# Patient Record
Sex: MALE | Race: BLACK OR AFRICAN AMERICAN | NOT HISPANIC OR LATINO
[De-identification: names, ages, dates, MRNs, and addresses within clinical notes are randomized per-mention and may not be internally consistent; named-entity substitution may affect disease eponyms.]

---

## 2021-03-30 PROBLEM — Z00.00 ENCOUNTER FOR PREVENTIVE HEALTH EXAMINATION: Status: ACTIVE | Noted: 2021-03-30

## 2021-05-06 ENCOUNTER — APPOINTMENT (OUTPATIENT)
Dept: ENDOCRINOLOGY | Facility: CLINIC | Age: 56
End: 2021-05-06
Payer: MEDICAID

## 2021-05-06 ENCOUNTER — LABORATORY RESULT (OUTPATIENT)
Age: 56
End: 2021-05-06

## 2021-05-06 VITALS
DIASTOLIC BLOOD PRESSURE: 80 MMHG | HEIGHT: 73 IN | BODY MASS INDEX: 21.74 KG/M2 | SYSTOLIC BLOOD PRESSURE: 160 MMHG | HEART RATE: 96 BPM | OXYGEN SATURATION: 98 % | WEIGHT: 164 LBS

## 2021-05-06 DIAGNOSIS — Z86.39 PERSONAL HISTORY OF OTHER ENDOCRINE, NUTRITIONAL AND METABOLIC DISEASE: ICD-10-CM

## 2021-05-06 DIAGNOSIS — Z78.9 OTHER SPECIFIED HEALTH STATUS: ICD-10-CM

## 2021-05-06 DIAGNOSIS — Z82.61 FAMILY HISTORY OF ARTHRITIS: ICD-10-CM

## 2021-05-06 DIAGNOSIS — S06.9X9A UNSPECIFIED INTRACRANIAL INJURY WITH LOSS OF CONSCIOUSNESS OF UNSPECIFIED DURATION, INITIAL ENCOUNTER: ICD-10-CM

## 2021-05-06 DIAGNOSIS — Z86.73 PERSONAL HISTORY OF TRANSIENT ISCHEMIC ATTACK (TIA), AND CEREBRAL INFARCTION W/OUT RESIDUAL DEFICITS: ICD-10-CM

## 2021-05-06 DIAGNOSIS — Z82.3 FAMILY HISTORY OF STROKE: ICD-10-CM

## 2021-05-06 DIAGNOSIS — Z86.79 PERSONAL HISTORY OF OTHER DISEASES OF THE CIRCULATORY SYSTEM: ICD-10-CM

## 2021-05-06 PROCEDURE — 99072 ADDL SUPL MATRL&STAF TM PHE: CPT

## 2021-05-06 PROCEDURE — 99204 OFFICE O/P NEW MOD 45 MIN: CPT

## 2021-05-06 RX ORDER — ATORVASTATIN CALCIUM 20 MG/1
20 TABLET, FILM COATED ORAL DAILY
Refills: 0 | Status: ACTIVE | COMMUNITY

## 2021-05-06 RX ORDER — AMLODIPINE BESYLATE 10 MG/1
10 TABLET ORAL DAILY
Refills: 0 | Status: ACTIVE | COMMUNITY

## 2021-05-06 RX ORDER — VITAMIN K2 90 MCG
125 MCG CAPSULE ORAL
Refills: 0 | Status: ACTIVE | COMMUNITY

## 2021-05-06 RX ORDER — BACLOFEN 10 MG/1
10 TABLET ORAL 3 TIMES DAILY
Refills: 0 | Status: ACTIVE | COMMUNITY

## 2021-05-06 RX ORDER — GABAPENTIN 400 MG/1
400 CAPSULE ORAL 3 TIMES DAILY
Refills: 0 | Status: ACTIVE | COMMUNITY

## 2021-05-06 NOTE — HISTORY OF PRESENT ILLNESS
[FreeTextEntry1] : 55-year-old gentleman with prior history of diabetes which was diagnosed 2 years back level questions about this being type 1 diabetes he was initially on insulin as well as transition to oral hypoglycemic agents glimepiride to 4 mg 1 tablet twice daily per his prior endocrinologist.  He has history of stroke in 2018 and has residual left-sided weakness.  He is up-to-date on his eye exam he denies any other macrovascular complications.  His metabolic parameters have been suggestive of elevation in the creatinine and mildly decreased GFR.  He was noted to have an A1c of 6.2% in early April 2021.  He had mild anemia as well.  He does complain of nocturia.  He does have occasional headaches.  \par The patient he self increase his glimepiride when he was taken off insulin as he has noted increased blood sugars to be high.  His average blood sugar currently is around 120 mg percent he denies any hypoglycemic episodes.  He overall feels that his diabetes control is acceptable.  He is aware that he has to stay away from alcohol and sweet and is tries to be as active as much as possible.\par \par \par

## 2021-05-07 LAB
ALBUMIN SERPL ELPH-MCNC: 4.5 G/DL
ALP BLD-CCNC: 137 U/L
ALT SERPL-CCNC: 57 U/L
ANION GAP SERPL CALC-SCNC: 12 MMOL/L
AST SERPL-CCNC: 35 U/L
BILIRUB SERPL-MCNC: 0.3 MG/DL
BUN SERPL-MCNC: 32 MG/DL
CALCIUM SERPL-MCNC: 9.8 MG/DL
CHLORIDE SERPL-SCNC: 108 MMOL/L
CO2 SERPL-SCNC: 23 MMOL/L
CREAT SERPL-MCNC: 1.71 MG/DL
CREAT SPEC-SCNC: 150 MG/DL
GLUCOSE SERPL-MCNC: 120 MG/DL
MICROALBUMIN 24H UR DL<=1MG/L-MCNC: 93.7 MG/DL
MICROALBUMIN/CREAT 24H UR-RTO: 625 MG/G
POTASSIUM SERPL-SCNC: 4.7 MMOL/L
PROT SERPL-MCNC: 7.7 G/DL
SODIUM SERPL-SCNC: 143 MMOL/L
TSH SERPL-ACNC: 4.41 UIU/ML

## 2021-05-12 ENCOUNTER — APPOINTMENT (OUTPATIENT)
Dept: NEUROLOGY | Facility: CLINIC | Age: 56
End: 2021-05-12
Payer: MEDICAID

## 2021-05-12 VITALS
BODY MASS INDEX: 21.47 KG/M2 | DIASTOLIC BLOOD PRESSURE: 86 MMHG | HEART RATE: 97 BPM | SYSTOLIC BLOOD PRESSURE: 153 MMHG | HEIGHT: 73 IN | WEIGHT: 162 LBS | TEMPERATURE: 97.4 F

## 2021-05-12 DIAGNOSIS — M24.529 CONTRACTURE, UNSPECIFIED ELBOW: ICD-10-CM

## 2021-05-12 LAB — GAD65 AB SER-MCNC: 0 NMOL/L

## 2021-05-12 PROCEDURE — 99204 OFFICE O/P NEW MOD 45 MIN: CPT

## 2021-05-12 PROCEDURE — 99072 ADDL SUPL MATRL&STAF TM PHE: CPT

## 2021-05-12 NOTE — PHYSICAL EXAM
[FreeTextEntry1] : Neuro Exam\par MS: AAOx3, follows commands, good comprehension, fund of knowledge appears intact, no aphasia, no dysarthria\par CN:  PERRL, EOMI, peripheral vision intact, v1-v3 intact, hearing intact, left facial droop, tongue & uvula midline, shoulder shrug equal strength\par Motor:  left side limited due to contracture: at least 1/5 in LUE, 2-3/5 in LLE, no drift, no rigidity, no abnormal atrophy\par Sensory: Lt/PP intact\par Coord: no tremors\par DTR: 0\par \par

## 2021-05-12 NOTE — ASSESSMENT
[FreeTextEntry1] : \par Pt with hx of RICH, and subsequent left side weakness and contracture\par \par - bp goal: normotensive.\par - cont with gabapentin and baclofen for pain control\par - PT/OT referral ordered\par - will transfer care to Dr Houston for botox injection\par \par

## 2021-05-12 NOTE — HISTORY OF PRESENT ILLNESS
[FreeTextEntry1] : Pt is 56 yo RH M with hx of DM and stroke here to be re-established with neuro care.\par \par Pt seen in the office along with pt's sister.\par \par Pt with hx of stroke (as per sister, R ICH) with residual left side weakness.  Pt moved from [place] to [place] after the stroke, had received Pt and botox for left arm contracture, but insurance no longer covers, and not received injection since late last year.\par \par Currently pt is  AAox3, no HA, no blurry vision, no nausea. No new deficits from prior stroke, but reports worsening baseline pain and mobility.\par \par

## 2021-05-20 ENCOUNTER — APPOINTMENT (OUTPATIENT)
Dept: ENDOCRINOLOGY | Facility: CLINIC | Age: 56
End: 2021-05-20
Payer: MEDICAID

## 2021-05-20 PROCEDURE — ZZZZZ: CPT

## 2021-06-03 ENCOUNTER — APPOINTMENT (OUTPATIENT)
Dept: ENDOCRINOLOGY | Facility: CLINIC | Age: 56
End: 2021-06-03
Payer: MEDICAID

## 2021-06-03 PROCEDURE — ZZZZZ: CPT

## 2021-06-25 ENCOUNTER — RESULT REVIEW (OUTPATIENT)
Age: 56
End: 2021-06-25

## 2021-06-25 ENCOUNTER — APPOINTMENT (OUTPATIENT)
Dept: NEPHROLOGY | Facility: CLINIC | Age: 56
End: 2021-06-25
Payer: MEDICAID

## 2021-06-25 VITALS
HEART RATE: 83 BPM | WEIGHT: 165 LBS | DIASTOLIC BLOOD PRESSURE: 70 MMHG | OXYGEN SATURATION: 98 % | BODY MASS INDEX: 21.87 KG/M2 | TEMPERATURE: 96.8 F | SYSTOLIC BLOOD PRESSURE: 140 MMHG | HEIGHT: 73 IN

## 2021-06-25 PROCEDURE — 36415 COLL VENOUS BLD VENIPUNCTURE: CPT

## 2021-06-25 PROCEDURE — 99204 OFFICE O/P NEW MOD 45 MIN: CPT | Mod: 25

## 2021-06-25 PROCEDURE — 99072 ADDL SUPL MATRL&STAF TM PHE: CPT

## 2021-06-25 NOTE — PHYSICAL EXAM
[General Appearance - Alert] : alert [General Appearance - In No Acute Distress] : in no acute distress [Sclera] : the sclera and conjunctiva were normal [PERRL With Normal Accommodation] : pupils were equal in size, round, and reactive to light [Outer Ear] : the ears and nose were normal in appearance [Hearing Threshold Finger Rub Not [place]] : hearing was normal [Neck Appearance] : the appearance of the neck was normal [Respiration, Rhythm And Depth] : normal respiratory rhythm and effort [Exaggerated Use Of Accessory Muscles For Inspiration] : no accessory muscle use [Apical Impulse] : the apical impulse was normal [Heart Sounds] : normal S1 and S2 [Edema] : there was no peripheral edema [Veins - Varicosity Changes] : there were no varicosital changes [Bowel Sounds] : normal bowel sounds [Abdomen Tenderness] : non-tender [Skin Color & Pigmentation] : normal skin color and pigmentation [] : no rash [FreeTextEntry1] : L sided hemiparesis [Oriented To Time, Place, And Person] : oriented to person, place, and time [Impaired Insight] : insight and judgment were intact

## 2021-06-25 NOTE — HISTORY OF PRESENT ILLNESS
[FreeTextEntry1] : [facility] male recently relocated from [place] to [place] after hemorrhagic stroke resulting in left sided hemiparesis - was seen by Dr. Cisse ( neuro) and Dr. Guevara ( [place]) - referred for eval of abnormal cr - \par \par Pt denies NSAIDs, no EtOH\par \par Unmarried, 2 children\par \par

## 2021-06-25 NOTE — REASON FOR VISIT
chest pain [Initial Evaluation] : an initial evaluation [Family Member] : family member [FreeTextEntry1] : eval for CKD

## 2021-06-25 NOTE — ASSESSMENT
[FreeTextEntry1] : CKD 3 2/2 DM and HTN w/proteinuria - ruben pt states DM x 2 yrs, was diagnosed at tie of CVA, was not seing MDs prior, so likely had it longer\par - appropriately on losartan ( 100mg) for proteinuria\par - CMET TSH, Microal, TFTs reviewed\par - Neuro and Endo notes reviewed\par - counseled o avoidance of NSAIDs\par - counseled on addressing risk factors for disease progression ( ie hyperliidemia, hyperuricemia, HTN, Dm)\par \par Check renal US\par repeat  UPC and BMET\par \par f/u in 4 months, sooner dep onlabs\par \par

## 2021-08-06 ENCOUNTER — APPOINTMENT (OUTPATIENT)
Dept: ENDOCRINOLOGY | Facility: CLINIC | Age: 56
End: 2021-08-06
Payer: MEDICAID

## 2021-08-06 VITALS
DIASTOLIC BLOOD PRESSURE: 80 MMHG | OXYGEN SATURATION: 98 % | HEIGHT: 73 IN | WEIGHT: 170 LBS | SYSTOLIC BLOOD PRESSURE: 158 MMHG | HEART RATE: 89 BPM | BODY MASS INDEX: 22.53 KG/M2

## 2021-08-06 LAB — GLUCOSE BLDC GLUCOMTR-MCNC: 137

## 2021-08-06 PROCEDURE — 99214 OFFICE O/P EST MOD 30 MIN: CPT | Mod: 25

## 2021-08-06 PROCEDURE — 82962 GLUCOSE BLOOD TEST: CPT

## 2021-08-06 RX ORDER — FLUOXETINE HYDROCHLORIDE 10 MG/1
10 CAPSULE ORAL
Refills: 0 | Status: ACTIVE | COMMUNITY

## 2021-08-06 RX ORDER — GLIMEPIRIDE 2 MG/1
2 TABLET ORAL DAILY
Qty: 90 | Refills: 0 | Status: ACTIVE | COMMUNITY

## 2021-08-06 RX ORDER — LOSARTAN POTASSIUM 100 MG/1
100 TABLET, FILM COATED ORAL DAILY
Refills: 0 | Status: ACTIVE | COMMUNITY

## 2021-08-06 RX ORDER — METOPROLOL TARTRATE 50 MG/1
50 TABLET, FILM COATED ORAL TWICE DAILY
Refills: 0 | Status: DISCONTINUED | COMMUNITY
End: 2021-08-06

## 2021-08-06 NOTE — HISTORY OF PRESENT ILLNESS
[FreeTextEntry1] : 55-year-old gentleman with prior history of diabetes which was diagnosed 2 years back level questions about this being type 1 diabetes he was initially on insulin as well as transition to oral hypoglycemic agents glimepiride to 4 mg 1 tablet twice daily per his prior endocrinologist.  He has history of stroke in 2018 and has residual left-sided weakness.  He is up-to-date on his eye exam he denies any other macrovascular complications.  His metabolic parameters have been suggestive of elevation in the creatinine and mildly decreased GFR.  He was noted to have an A1c of 6.2% in early April 2021.  He had mild anemia as well.  He does complain of nocturia.  He does have occasional headaches.  \par The patient he self increase his glimepiride when he was taken off insulin as he has noted increased blood sugars to be high.  His average blood sugar currently is around 120 mg percent he denies any hypoglycemic episodes.  He overall feels that his diabetes control is acceptable.  He is aware that he has to stay away from alcohol and sweet and is tries to be as active as much as possible.\par \par 08/06/2021- FOLLOW UP\par Patient presents for type 2 diabetes follow-up.  He was placed on prabha multiple times but no significant data was available because of multiple malfunctions, I got a chance to review the prabha report today and on further questioning the data does not correlate with his symptoms.  He is currently taking glimepiride 2 mg twice daily.  He had some low blood sugar episode this morning of blood sugar of 73 mg percent.  He also had some labs done with nephrology he does have measurable C-peptide levels which are high likely due to glimepiride use, certainly there is no evidence of type 1 diabetes.\par

## 2021-09-01 ENCOUNTER — APPOINTMENT (OUTPATIENT)
Dept: NEUROLOGY | Facility: CLINIC | Age: 56
End: 2021-09-01
Payer: MEDICAID

## 2021-09-01 VITALS
WEIGHT: 170 LBS | BODY MASS INDEX: 22.53 KG/M2 | DIASTOLIC BLOOD PRESSURE: 78 MMHG | HEART RATE: 85 BPM | HEIGHT: 73 IN | TEMPERATURE: 97.5 F | SYSTOLIC BLOOD PRESSURE: 153 MMHG

## 2021-09-01 DIAGNOSIS — R25.2 OTHER SEQUELAE OF CEREBRAL INFARCTION: ICD-10-CM

## 2021-09-01 DIAGNOSIS — I61.9 NONTRAUMATIC INTRACEREBRAL HEMORRHAGE, UNSPECIFIED: ICD-10-CM

## 2021-09-01 DIAGNOSIS — R53.1 WEAKNESS: ICD-10-CM

## 2021-09-01 DIAGNOSIS — I69.398 OTHER SEQUELAE OF CEREBRAL INFARCTION: ICD-10-CM

## 2021-09-01 PROCEDURE — 99214 OFFICE O/P EST MOD 30 MIN: CPT

## 2021-09-01 NOTE — PHYSICAL EXAM
[Person] : oriented to person [Place] : oriented to place [Time] : oriented to time [Concentration Intact] : normal concentrating ability [Comprehension] : comprehension intact [Cranial Nerves Optic (II)] : visual acuity intact bilaterally,  visual fields full to confrontation, pupils equal round and reactive to light [Cranial Nerves Oculomotor (III)] : extraocular motion intact [Cranial Nerves Trigeminal (V)] : facial sensation intact symmetrically [Cranial Nerves Facial (VII)] : face symmetrical [Cranial Nerves Vestibulocochlear (VIII)] : hearing was intact bilaterally [Cranial Nerves Glossopharyngeal (IX)] : tongue and palate midline [Cranial Nerves Hypoglossal (XII)] : there was no tongue deviation with protrusion [CNS Accessory - Diminished Shoulder Elev. - Left Trapezius] : weakness of shoulder elevation was present on the left [Involuntary Movements] : no involuntary movements were seen [Sensation Tactile Decrease] : light touch was intact [0] : Triceps left 0 [2+] : Brachioradialis left 2+ [1+] : Ankle jerk left 1+ [FreeTextEntry1] : \par Strength is normal in the right upper and lower extremities diffusely.\par \par The left arm has impaired shoulder shrug and shoulder abduction. The arm is flexed at the the elbow with ulnar deviation of the wrist and significant spasticity of the finger flexors and thumb flexor. Left arm is pronated, unable to supinate. There is impaired knee extension and flexion on the left, and inversion of the left foot. There is a foot drop, but plantar flexion is 4/5. There his hip flexion weakness 4/5 on the left as well.\par \par Patient ambulates with a cane. There is circumduction of the left leg, with ankle inversion.

## 2021-09-01 NOTE — HISTORY OF PRESENT ILLNESS
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 He presents for initial evaluation in the [facility] at [facility] for potential botox injections. He was referred by Dr. Cisse. He is accompanied by his sister who provides collateral history.\par \par Per the patient, his left side was weak at the time of his stroke. No speech changes. He has numbness/tingling now in the left arm and leg, but not at the time of his stroke. He was having some dizziness.\par \par Currently, he has occasional headaches. No double or blurry vision. No trouble speaking/swallowing. The numbness/tingling is intermittent. He uses a cane to ambulate. He gets stiffness in the left leg. He has trouble moving the left leg. He is independent in ADLs. His left arm tends to flex at the elbow when walking.\par \par Last botox injection was in June 2020 at [facility]. No side effects from the botox. He was able to raise his hand more. He felt he had more function after the botox. He has only had the arm done and was unable to do the leg due to insurance change.\par \par

## 2021-09-01 NOTE — DISCUSSION/SUMMARY
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 He presents for initial evaluation in the [facility] Disorders Clinic at [facility] for potential botox injections. He was referred by Dr. Cisse. He is accompanied by his sister who provides collateral history.\par \par The patient's neurologic examination was significant for left sided hemiparesis with associated post-stroke spasticity in the left upper and lower extremities that is affecting the patient's ability to move his left side. As a result, he qualifies for botox injections for posthemorrhagic stroke spasticity.\par \par Will obtain authorization for Botox and then have the patient scheduled for initial injections. Side effects and benefits were discussed in detail.\par \par In the meantime, he will increase his Baclofen to 10mg TID. If no benefit and no side effects, he will increase to 1.5 tabs TID x 1 week, and further to 2 tabs TID if still no beneft and no side effects.\par \par All questions were answered to the best of my knowledge. I spent 45 minutes with this patient.

## 2021-09-15 ENCOUNTER — APPOINTMENT (OUTPATIENT)
Dept: NEUROLOGY | Facility: CLINIC | Age: 56
End: 2021-09-15
Payer: MEDICAID

## 2021-09-15 PROCEDURE — 64642 CHEMODENERV 1 EXTREMITY 1-4: CPT

## 2021-09-15 PROCEDURE — 64643 CHEMODENERV 1 EXTREM 1-4 EA: CPT

## 2021-10-06 NOTE — PROCEDURE
[FreeTextEntry1] : Botox injections [FreeTextEntry2] : Left upper and lower extremity spasticity [FreeTextEntry4] : None [FreeTextEntry3] : Patient was injected with Onabotulinum Toxin A in the muscles outlined below after botox was diluted to 5 units/0.1cc with normal saline. Area for injection prepped with alcohol.\par \par Left upper extremity:\par Biceps\par Flexor digitorum superficialis\par Flexor digitorum profundus\par pronator\par Adductor pollicis\par Flexor pollicis longus\par \par Tibialis posterior\par \par Total units used: 200 units\par Unavoidable waste: 0 units\par Total units: 200 units\par \par BOTOX 100/UNIT VL\par NDC: 4350-7800-23\par LOT: [id number]\par EXP: 10/2023\par [id number]

## 2021-10-26 ENCOUNTER — APPOINTMENT (OUTPATIENT)
Dept: ENDOCRINOLOGY | Facility: CLINIC | Age: 56
End: 2021-10-26

## 2021-11-05 ENCOUNTER — APPOINTMENT (OUTPATIENT)
Dept: ENDOCRINOLOGY | Facility: CLINIC | Age: 56
End: 2021-11-05

## 2021-12-09 ENCOUNTER — RESULT REVIEW (OUTPATIENT)
Age: 56
End: 2021-12-09

## 2021-12-09 ENCOUNTER — APPOINTMENT (OUTPATIENT)
Dept: NEPHROLOGY | Facility: CLINIC | Age: 56
End: 2021-12-09
Payer: MEDICAID

## 2021-12-09 VITALS
SYSTOLIC BLOOD PRESSURE: 140 MMHG | HEIGHT: 73 IN | DIASTOLIC BLOOD PRESSURE: 80 MMHG | OXYGEN SATURATION: 98 % | TEMPERATURE: 96.7 F | WEIGHT: 157 LBS | BODY MASS INDEX: 20.81 KG/M2 | HEART RATE: 97 BPM

## 2021-12-09 DIAGNOSIS — N18.30 TYPE 2 DIABETES MELLITUS WITH DIABETIC CHRONIC KIDNEY DISEASE: ICD-10-CM

## 2021-12-09 DIAGNOSIS — R80.8 OTHER PROTEINURIA: ICD-10-CM

## 2021-12-09 DIAGNOSIS — E11.22 TYPE 2 DIABETES MELLITUS WITH DIABETIC CHRONIC KIDNEY DISEASE: ICD-10-CM

## 2021-12-09 DIAGNOSIS — I10 ESSENTIAL (PRIMARY) HYPERTENSION: ICD-10-CM

## 2021-12-09 PROCEDURE — 99214 OFFICE O/P EST MOD 30 MIN: CPT

## 2021-12-09 RX ORDER — DAPAGLIFLOZIN 10 MG/1
10 TABLET, FILM COATED ORAL
Qty: 30 | Refills: 1 | Status: ACTIVE | COMMUNITY
Start: 2021-12-09 | End: 1900-01-01

## 2021-12-09 NOTE — ASSESSMENT
[FreeTextEntry1] : CKD 3 2/2 DM and HTN w/proteinuria\par  -though pt states DM x 2 yrs, was diagnosed at time of CVA, was not seeing MDs prior, so likely had it longer\par - appropriately on losartan ( 100mg) for proteinuria\par - CMET TSH, Microal, TFTs reviewed\par - Neuro and Endo notes reviewed\par - counseled on avoidance of NSAIDs\par - counseled on addressing risk factors for disease progression ( ie hyperlipidemia, hyperuricemia, HTN, Dm)\par \par [facility] US report reviewed,no e/o LAMINE, +ve for medical renal disease\par repeat  UPC and BMET\par chck lipids, b12 folate\par rec starting SGLT2, will cc PCP, f/u in 2 months, repeat labs again in a month\par rx for pool therapy\par \par \par \par \par

## 2021-12-09 NOTE — PHYSICAL EXAM
[General Appearance - Alert] : alert [General Appearance - In No Acute Distress] : in no acute distress [Sclera] : the sclera and conjunctiva were normal [PERRL With Normal Accommodation] : pupils were equal in size, round, and reactive to light [Outer Ear] : the ears and nose were normal in appearance [Hearing Threshold Finger Rub Not [place]] : hearing was normal [Neck Appearance] : the appearance of the neck was normal [Respiration, Rhythm And Depth] : normal respiratory rhythm and effort [Exaggerated Use Of Accessory Muscles For Inspiration] : no accessory muscle use [Apical Impulse] : the apical impulse was normal [Heart Sounds] : normal S1 and S2 [Edema] : there was no peripheral edema [Veins - Varicosity Changes] : there were no varicosital changes [Bowel Sounds] : normal bowel sounds [Abdomen Tenderness] : non-tender [Skin Color & Pigmentation] : normal skin color and pigmentation [] : no rash [Oriented To Time, Place, And Person] : oriented to person, place, and time [Impaired Insight] : insight and judgment were intact [FreeTextEntry1] : L sided hemiparesis

## 2021-12-09 NOTE — REASON FOR VISIT
[Initial Evaluation] : an initial evaluation [Family Member] : family member [FreeTextEntry1] : eval for CKD

## 2022-01-03 ENCOUNTER — APPOINTMENT (OUTPATIENT)
Dept: ENDOCRINOLOGY | Facility: CLINIC | Age: 57
End: 2022-01-03
Payer: MEDICAID

## 2022-01-03 PROCEDURE — 95250 CONT GLUC MNTR PHYS/QHP EQP: CPT

## 2022-01-14 ENCOUNTER — APPOINTMENT (OUTPATIENT)
Dept: ENDOCRINOLOGY | Facility: CLINIC | Age: 57
End: 2022-01-14
Payer: MEDICAID

## 2022-01-14 VITALS
HEART RATE: 86 BPM | SYSTOLIC BLOOD PRESSURE: 130 MMHG | DIASTOLIC BLOOD PRESSURE: 80 MMHG | BODY MASS INDEX: 21.77 KG/M2 | WEIGHT: 165 LBS

## 2022-01-14 DIAGNOSIS — E11.9 TYPE 2 DIABETES MELLITUS W/OUT COMPLICATIONS: ICD-10-CM

## 2022-01-14 DIAGNOSIS — Z91.89 OTHER SPECIFIED PERSONAL RISK FACTORS, NOT ELSEWHERE CLASSIFIED: ICD-10-CM

## 2022-01-14 LAB
GLUCOSE BLDC GLUCOMTR-MCNC: 133
GLUCOSE BLDC GLUCOMTR-MCNC: 78

## 2022-01-14 PROCEDURE — 99072 ADDL SUPL MATRL&STAF TM PHE: CPT

## 2022-01-14 PROCEDURE — 99215 OFFICE O/P EST HI 40 MIN: CPT | Mod: 25

## 2022-01-14 PROCEDURE — 95251 CONT GLUC MNTR ANALYSIS I&R: CPT

## 2022-01-14 PROCEDURE — 95250 CONT GLUC MNTR PHYS/QHP EQP: CPT

## 2022-01-14 RX ORDER — LINAGLIPTIN 5 MG/1
5 TABLET, FILM COATED ORAL
Qty: 90 | Refills: 0 | Status: DISCONTINUED | COMMUNITY
Start: 2021-08-06 | End: 2022-01-14

## 2022-01-14 NOTE — HISTORY OF PRESENT ILLNESS
[FreeTextEntry1] : 55-year-old gentleman with prior history of diabetes which was diagnosed 2 years back level questions about this being type 1 diabetes he was initially on insulin as well as transition to oral hypoglycemic agents glimepiride to 4 mg 1 tablet twice daily per his prior endocrinologist.  He has history of stroke in 2018 and has residual left-sided weakness.  He is up-to-date on his eye exam he denies any other macrovascular complications.  His metabolic parameters have been suggestive of elevation in the creatinine and mildly decreased GFR.  He was noted to have an A1c of 6.2% in early April 2021.  He had mild anemia as well.  He does complain of nocturia.  He does have occasional headaches.  \par The patient he self increase his glimepiride when he was taken off insulin as he has noted increased blood sugars to be high.  His average blood sugar currently is around 120 mg percent he denies any hypoglycemic episodes.  He overall feels that his diabetes control is acceptable.  He is aware that he has to stay away from alcohol and sweet and is tries to be as active as much as possible.\par \par 08/06/2021- FOLLOW UP\par Patient presents for type 2 diabetes follow-up.  He was placed on prabha multiple times but no significant data was available because of multiple malfunctions, I got a chance to review the prabha report today and on further questioning the data does not correlate with his symptoms.  He is currently taking glimepiride 2 mg twice daily.  He had some low blood sugar episode this morning of blood sugar of 73 mg percent.  He also had some labs done with nephrology he does have measurable C-peptide levels which are high likely due to glimepiride use, certainly there is no evidence of type 1 diabetes.\par \par \par 01/14/2022- FOLLOW UP\par Patient presents for type 2 diabetes follow-up.  Currently on glimepiride 2 mg daily CGM download reviewed high risk of hypoglycemia with low to very low in approximately 24% of the times today also had low blood sugar asymptomatic in the office.  Tradjenta is not covered by insurance in spite of repeated requests.  Reviewed nephrology note certainly SGLT2 inhibitors are a consideration.  I have advised him to decrease glimepiride to 1 mg a day and have also prescribed Farxiga 5 mg hoping the insurance would cover.  Advised to stop today.  If he starts Farxiga.  Additionally patient was given to apple juice in the office and blood sugar was checked prior to discharge prior to discharge his blood sugar was 133 mg percent.\par CGM report reviewed ,  interpreted and discussed with the patient.\par PIs refer to the scanned document for further details\par \par

## 2022-02-01 ENCOUNTER — NON-APPOINTMENT (OUTPATIENT)
Age: 57
End: 2022-02-01

## 2022-02-02 ENCOUNTER — APPOINTMENT (OUTPATIENT)
Dept: NEUROLOGY | Facility: CLINIC | Age: 57
End: 2022-02-02

## 2022-02-10 ENCOUNTER — APPOINTMENT (OUTPATIENT)
Dept: NEPHROLOGY | Facility: CLINIC | Age: 57
End: 2022-02-10

## 2022-03-01 ENCOUNTER — RX RENEWAL (OUTPATIENT)
Age: 57
End: 2022-03-01

## 2022-03-07 ENCOUNTER — RX RENEWAL (OUTPATIENT)
Age: 57
End: 2022-03-07

## 2022-03-07 RX ORDER — DAPAGLIFLOZIN 5 MG/1
5 TABLET, FILM COATED ORAL DAILY
Qty: 90 | Refills: 0 | Status: ACTIVE | COMMUNITY
Start: 2022-01-14 | End: 1900-01-01

## 2022-03-16 ENCOUNTER — APPOINTMENT (OUTPATIENT)
Dept: ENDOCRINOLOGY | Facility: CLINIC | Age: 57
End: 2022-03-16

## 2022-03-30 ENCOUNTER — APPOINTMENT (OUTPATIENT)
Dept: ENDOCRINOLOGY | Facility: CLINIC | Age: 57
End: 2022-03-30